# Patient Record
Sex: MALE | Race: BLACK OR AFRICAN AMERICAN | NOT HISPANIC OR LATINO | Employment: UNEMPLOYED | URBAN - NONMETROPOLITAN AREA
[De-identification: names, ages, dates, MRNs, and addresses within clinical notes are randomized per-mention and may not be internally consistent; named-entity substitution may affect disease eponyms.]

---

## 2024-11-04 ENCOUNTER — APPOINTMENT (OUTPATIENT)
Dept: RADIOLOGY | Facility: HOSPITAL | Age: 54
End: 2024-11-04

## 2024-11-04 ENCOUNTER — APPOINTMENT (OUTPATIENT)
Dept: CARDIOLOGY | Facility: HOSPITAL | Age: 54
End: 2024-11-04

## 2024-11-04 ENCOUNTER — APPOINTMENT (OUTPATIENT)
Dept: RADIOLOGY | Facility: HOSPITAL | Age: 54
End: 2024-11-04
Payer: COMMERCIAL

## 2024-11-04 ENCOUNTER — HOSPITAL ENCOUNTER (OUTPATIENT)
Facility: HOSPITAL | Age: 54
Setting detail: OBSERVATION
Discharge: HOME | End: 2024-11-05
Attending: EMERGENCY MEDICINE | Admitting: STUDENT IN AN ORGANIZED HEALTH CARE EDUCATION/TRAINING PROGRAM
Payer: COMMERCIAL

## 2024-11-04 DIAGNOSIS — R41.82 ALTERED MENTAL STATUS, UNSPECIFIED ALTERED MENTAL STATUS TYPE: ICD-10-CM

## 2024-11-04 DIAGNOSIS — E87.6 HYPOKALEMIA: ICD-10-CM

## 2024-11-04 DIAGNOSIS — I49.3 PVC'S (PREMATURE VENTRICULAR CONTRACTIONS): ICD-10-CM

## 2024-11-04 DIAGNOSIS — R65.20 SEVERE SEPSIS: Primary | ICD-10-CM
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
## 2024-11-04 DIAGNOSIS — E87.20 LACTIC ACIDOSIS: ICD-10-CM

## 2024-11-04 DIAGNOSIS — A41.9 SEVERE SEPSIS: Primary | ICD-10-CM

## 2024-11-04 DIAGNOSIS — T75.4XXA TASER INJURY, INITIAL ENCOUNTER: ICD-10-CM

## 2024-11-04 DIAGNOSIS — N40.0 ENLARGED PROSTATE: ICD-10-CM

## 2024-11-04 PROBLEM — G93.41 METABOLIC ENCEPHALOPATHY: Status: ACTIVE | Noted: 2024-11-04

## 2024-11-04 PROBLEM — N17.9 AKI (ACUTE KIDNEY INJURY) (CMS-HCC): Status: ACTIVE | Noted: 2024-11-04

## 2024-11-04 PROBLEM — F20.3 UNDIFFERENTIATED SCHIZOPHRENIA (MULTI): Status: ACTIVE | Noted: 2024-11-04

## 2024-11-04 LAB
ALBUMIN SERPL BCP-MCNC: 4.5 G/DL (ref 3.4–5)
ALP SERPL-CCNC: 76 U/L (ref 33–120)
ALT SERPL W P-5'-P-CCNC: 11 U/L (ref 10–52)
AMMONIA PLAS-SCNC: 31 UMOL/L (ref 16–53)
AMPHETAMINES UR QL SCN: NORMAL
ANION GAP BLDV CALCULATED.4IONS-SCNC: 14 MMOL/L (ref 10–25)
ANION GAP SERPL CALC-SCNC: 16 MMOL/L (ref 10–20)
APAP SERPL-MCNC: <10 UG/ML
APPEARANCE UR: ABNORMAL
AST SERPL W P-5'-P-CCNC: 14 U/L (ref 9–39)
BACTERIA #/AREA URNS AUTO: ABNORMAL /HPF
BARBITURATES UR QL SCN: NORMAL
BASE EXCESS BLDV CALC-SCNC: 1.7 MMOL/L (ref -2–3)
BASOPHILS # BLD AUTO: 0.04 X10*3/UL (ref 0–0.1)
BASOPHILS NFR BLD AUTO: 0.2 %
BENZODIAZ UR QL SCN: NORMAL
BILIRUB SERPL-MCNC: 1.4 MG/DL (ref 0–1.2)
BILIRUB UR STRIP.AUTO-MCNC: NEGATIVE MG/DL
BODY TEMPERATURE: ABNORMAL
BUN SERPL-MCNC: 12 MG/DL (ref 6–23)
BZE UR QL SCN: NORMAL
CA-I BLDV-SCNC: 1.23 MMOL/L (ref 1.1–1.33)
CALCIUM SERPL-MCNC: 9.8 MG/DL (ref 8.6–10.3)
CANNABINOIDS UR QL SCN: NORMAL
CARDIAC TROPONIN I PNL SERPL HS: 20 NG/L (ref 0–20)
CHLORIDE BLDV-SCNC: 101 MMOL/L (ref 98–107)
CHLORIDE SERPL-SCNC: 100 MMOL/L (ref 98–107)
CK SERPL-CCNC: 393 U/L (ref 0–325)
CO2 SERPL-SCNC: 25 MMOL/L (ref 21–32)
COLOR UR: YELLOW
CREAT SERPL-MCNC: 1.44 MG/DL (ref 0.5–1.3)
EGFRCR SERPLBLD CKD-EPI 2021: 58 ML/MIN/1.73M*2
EOSINOPHIL # BLD AUTO: 0.01 X10*3/UL (ref 0–0.7)
EOSINOPHIL NFR BLD AUTO: 0.1 %
ERYTHROCYTE [DISTWIDTH] IN BLOOD BY AUTOMATED COUNT: 13.3 % (ref 11.5–14.5)
ETHANOL SERPL-MCNC: <10 MG/DL
FENTANYL+NORFENTANYL UR QL SCN: NORMAL
FLUAV RNA RESP QL NAA+PROBE: NOT DETECTED
FLUBV RNA RESP QL NAA+PROBE: NOT DETECTED
GLUCOSE BLDV-MCNC: 115 MG/DL (ref 74–99)
GLUCOSE SERPL-MCNC: 111 MG/DL (ref 74–99)
GLUCOSE UR STRIP.AUTO-MCNC: NEGATIVE MG/DL
HCO3 BLDV-SCNC: 25.8 MMOL/L (ref 22–26)
HCT VFR BLD AUTO: 44.5 % (ref 41–52)
HCT VFR BLD EST: 49 % (ref 41–52)
HGB BLD-MCNC: 15.3 G/DL (ref 13.5–17.5)
HGB BLDV-MCNC: 16.2 G/DL (ref 13.5–17.5)
HOLD SPECIMEN: NORMAL
HYALINE CASTS #/AREA URNS AUTO: ABNORMAL /LPF
IMM GRANULOCYTES # BLD AUTO: 0.08 X10*3/UL (ref 0–0.7)
IMM GRANULOCYTES NFR BLD AUTO: 0.4 % (ref 0–0.9)
INHALED O2 CONCENTRATION: 21 %
KETONES UR STRIP.AUTO-MCNC: ABNORMAL MG/DL
LACTATE BLDV-SCNC: 6.6 MMOL/L (ref 0.4–2)
LACTATE SERPL-SCNC: 1.7 MMOL/L (ref 0.4–2)
LACTATE SERPL-SCNC: 5.3 MMOL/L (ref 0.4–2)
LEUKOCYTE ESTERASE UR QL STRIP.AUTO: NEGATIVE
LYMPHOCYTES # BLD AUTO: 1.27 X10*3/UL (ref 1.2–4.8)
LYMPHOCYTES NFR BLD AUTO: 6.7 %
MAGNESIUM SERPL-MCNC: 2.31 MG/DL (ref 1.6–2.4)
MCH RBC QN AUTO: 30.1 PG (ref 26–34)
MCHC RBC AUTO-ENTMCNC: 34.4 G/DL (ref 32–36)
MCV RBC AUTO: 88 FL (ref 80–100)
METHADONE UR QL SCN: NORMAL
MONOCYTES # BLD AUTO: 1.47 X10*3/UL (ref 0.1–1)
MONOCYTES NFR BLD AUTO: 7.8 %
MUCOUS THREADS #/AREA URNS AUTO: ABNORMAL /LPF
NEUTROPHILS # BLD AUTO: 16.01 X10*3/UL (ref 1.2–7.7)
NEUTROPHILS NFR BLD AUTO: 84.8 %
NITRITE UR QL STRIP.AUTO: NEGATIVE
NRBC BLD-RTO: 0 /100 WBCS (ref 0–0)
OPIATES UR QL SCN: NORMAL
OXYCODONE+OXYMORPHONE UR QL SCN: NORMAL
OXYHGB MFR BLDV: 68.6 % (ref 45–75)
PCO2 BLDV: 38 MM HG (ref 41–51)
PCP UR QL SCN: NORMAL
PH BLDV: 7.44 PH (ref 7.33–7.43)
PH UR STRIP.AUTO: 5 [PH]
PLATELET # BLD AUTO: 401 X10*3/UL (ref 150–450)
PO2 BLDV: 42 MM HG (ref 35–45)
POTASSIUM BLDV-SCNC: 3.4 MMOL/L (ref 3.5–5.3)
POTASSIUM SERPL-SCNC: 3.2 MMOL/L (ref 3.5–5.3)
PROT SERPL-MCNC: 7.6 G/DL (ref 6.4–8.2)
PROT UR STRIP.AUTO-MCNC: ABNORMAL MG/DL
RBC # BLD AUTO: 5.08 X10*6/UL (ref 4.5–5.9)
RBC # UR STRIP.AUTO: NEGATIVE /UL
RBC #/AREA URNS AUTO: ABNORMAL /HPF
SALICYLATES SERPL-MCNC: <3 MG/DL
SAO2 % BLDV: 70 % (ref 45–75)
SARS-COV-2 RNA RESP QL NAA+PROBE: NOT DETECTED
SODIUM BLDV-SCNC: 137 MMOL/L (ref 136–145)
SODIUM SERPL-SCNC: 138 MMOL/L (ref 136–145)
SP GR UR STRIP.AUTO: 1.05
UROBILINOGEN UR STRIP.AUTO-MCNC: <2 MG/DL
WBC # BLD AUTO: 18.9 X10*3/UL (ref 4.4–11.3)
WBC #/AREA URNS AUTO: ABNORMAL /HPF

## 2024-11-04 PROCEDURE — 87040 BLOOD CULTURE FOR BACTERIA: CPT | Mod: CONLAB | Performed by: EMERGENCY MEDICINE

## 2024-11-04 PROCEDURE — 70450 CT HEAD/BRAIN W/O DYE: CPT

## 2024-11-04 PROCEDURE — 96365 THER/PROPH/DIAG IV INF INIT: CPT | Mod: 59

## 2024-11-04 PROCEDURE — G0378 HOSPITAL OBSERVATION PER HR: HCPCS

## 2024-11-04 PROCEDURE — 87635 SARS-COV-2 COVID-19 AMP PRB: CPT | Performed by: EMERGENCY MEDICINE

## 2024-11-04 PROCEDURE — 84484 ASSAY OF TROPONIN QUANT: CPT | Performed by: EMERGENCY MEDICINE

## 2024-11-04 PROCEDURE — 2500000004 HC RX 250 GENERAL PHARMACY W/ HCPCS (ALT 636 FOR OP/ED): Performed by: EMERGENCY MEDICINE

## 2024-11-04 PROCEDURE — 2550000001 HC RX 255 CONTRASTS: Performed by: EMERGENCY MEDICINE

## 2024-11-04 PROCEDURE — 74177 CT ABD & PELVIS W/CONTRAST: CPT | Mod: FOREIGN READ | Performed by: RADIOLOGY

## 2024-11-04 PROCEDURE — 71045 X-RAY EXAM CHEST 1 VIEW: CPT

## 2024-11-04 PROCEDURE — 82140 ASSAY OF AMMONIA: CPT | Performed by: EMERGENCY MEDICINE

## 2024-11-04 PROCEDURE — 71260 CT THORAX DX C+: CPT

## 2024-11-04 PROCEDURE — 36415 COLL VENOUS BLD VENIPUNCTURE: CPT | Performed by: EMERGENCY MEDICINE

## 2024-11-04 PROCEDURE — 82550 ASSAY OF CK (CPK): CPT | Performed by: EMERGENCY MEDICINE

## 2024-11-04 PROCEDURE — 70450 CT HEAD/BRAIN W/O DYE: CPT | Performed by: RADIOLOGY

## 2024-11-04 PROCEDURE — 93005 ELECTROCARDIOGRAM TRACING: CPT

## 2024-11-04 PROCEDURE — 2500000002 HC RX 250 W HCPCS SELF ADMINISTERED DRUGS (ALT 637 FOR MEDICARE OP, ALT 636 FOR OP/ED): Performed by: NURSE PRACTITIONER

## 2024-11-04 PROCEDURE — 80307 DRUG TEST PRSMV CHEM ANLYZR: CPT | Performed by: EMERGENCY MEDICINE

## 2024-11-04 PROCEDURE — 96366 THER/PROPH/DIAG IV INF ADDON: CPT

## 2024-11-04 PROCEDURE — 71260 CT THORAX DX C+: CPT | Mod: FOREIGN READ | Performed by: RADIOLOGY

## 2024-11-04 PROCEDURE — 96367 TX/PROPH/DG ADDL SEQ IV INF: CPT

## 2024-11-04 PROCEDURE — 83735 ASSAY OF MAGNESIUM: CPT | Performed by: EMERGENCY MEDICINE

## 2024-11-04 PROCEDURE — 99291 CRITICAL CARE FIRST HOUR: CPT | Performed by: EMERGENCY MEDICINE

## 2024-11-04 PROCEDURE — 71045 X-RAY EXAM CHEST 1 VIEW: CPT | Mod: FOREIGN READ | Performed by: RADIOLOGY

## 2024-11-04 PROCEDURE — 85025 COMPLETE CBC W/AUTO DIFF WBC: CPT | Performed by: EMERGENCY MEDICINE

## 2024-11-04 PROCEDURE — 2500000005 HC RX 250 GENERAL PHARMACY W/O HCPCS: Performed by: NURSE PRACTITIONER

## 2024-11-04 PROCEDURE — 84132 ASSAY OF SERUM POTASSIUM: CPT | Mod: 59 | Performed by: EMERGENCY MEDICINE

## 2024-11-04 PROCEDURE — 80143 DRUG ASSAY ACETAMINOPHEN: CPT | Performed by: EMERGENCY MEDICINE

## 2024-11-04 PROCEDURE — 99222 1ST HOSP IP/OBS MODERATE 55: CPT | Performed by: NURSE PRACTITIONER

## 2024-11-04 PROCEDURE — 81001 URINALYSIS AUTO W/SCOPE: CPT | Performed by: EMERGENCY MEDICINE

## 2024-11-04 PROCEDURE — 84132 ASSAY OF SERUM POTASSIUM: CPT | Performed by: EMERGENCY MEDICINE

## 2024-11-04 PROCEDURE — 94760 N-INVAS EAR/PLS OXIMETRY 1: CPT

## 2024-11-04 PROCEDURE — 2500000001 HC RX 250 WO HCPCS SELF ADMINISTERED DRUGS (ALT 637 FOR MEDICARE OP): Performed by: NURSE PRACTITIONER

## 2024-11-04 PROCEDURE — 2500000002 HC RX 250 W HCPCS SELF ADMINISTERED DRUGS (ALT 637 FOR MEDICARE OP, ALT 636 FOR OP/ED): Performed by: EMERGENCY MEDICINE

## 2024-11-04 PROCEDURE — 83605 ASSAY OF LACTIC ACID: CPT | Mod: 91 | Performed by: EMERGENCY MEDICINE

## 2024-11-04 RX ORDER — QUETIAPINE FUMARATE 100 MG/1
100 TABLET, FILM COATED ORAL NIGHTLY
Status: DISCONTINUED | OUTPATIENT
Start: 2024-11-04 | End: 2024-11-05

## 2024-11-04 RX ORDER — PANTOPRAZOLE SODIUM 40 MG/1
40 TABLET, DELAYED RELEASE ORAL
Status: DISCONTINUED | OUTPATIENT
Start: 2024-11-05 | End: 2024-11-05 | Stop reason: HOSPADM

## 2024-11-04 RX ORDER — AMOXICILLIN AND CLAVULANATE POTASSIUM 875; 125 MG/1; MG/1
1 TABLET, FILM COATED ORAL EVERY 12 HOURS SCHEDULED
Status: DISCONTINUED | OUTPATIENT
Start: 2024-11-04 | End: 2024-11-05

## 2024-11-04 RX ORDER — ONDANSETRON 4 MG/1
4 TABLET, ORALLY DISINTEGRATING ORAL ONCE
Status: DISCONTINUED | OUTPATIENT
Start: 2024-11-04 | End: 2024-11-05

## 2024-11-04 RX ORDER — ENOXAPARIN SODIUM 100 MG/ML
40 INJECTION SUBCUTANEOUS EVERY 24 HOURS
Status: DISCONTINUED | OUTPATIENT
Start: 2024-11-04 | End: 2024-11-05 | Stop reason: HOSPADM

## 2024-11-04 RX ORDER — LORAZEPAM 0.5 MG/1
0.5 TABLET ORAL EVERY 8 HOURS PRN
Status: DISCONTINUED | OUTPATIENT
Start: 2024-11-04 | End: 2024-11-05 | Stop reason: HOSPADM

## 2024-11-04 RX ORDER — POLYETHYLENE GLYCOL 3350 17 G/17G
17 POWDER, FOR SOLUTION ORAL DAILY PRN
Status: DISCONTINUED | OUTPATIENT
Start: 2024-11-04 | End: 2024-11-05 | Stop reason: HOSPADM

## 2024-11-04 RX ORDER — WATER
500 LIQUID (ML) MISCELLANEOUS ONCE
Status: COMPLETED | OUTPATIENT
Start: 2024-11-04 | End: 2024-11-04

## 2024-11-04 RX ORDER — ONDANSETRON HYDROCHLORIDE 2 MG/ML
4 INJECTION, SOLUTION INTRAVENOUS EVERY 8 HOURS PRN
Status: DISCONTINUED | OUTPATIENT
Start: 2024-11-04 | End: 2024-11-05 | Stop reason: HOSPADM

## 2024-11-04 RX ORDER — POTASSIUM CHLORIDE 20 MEQ/1
40 TABLET, EXTENDED RELEASE ORAL ONCE
Status: COMPLETED | OUTPATIENT
Start: 2024-11-04 | End: 2024-11-04

## 2024-11-04 RX ORDER — TALC
6 POWDER (GRAM) TOPICAL NIGHTLY
Status: DISCONTINUED | OUTPATIENT
Start: 2024-11-04 | End: 2024-11-05 | Stop reason: HOSPADM

## 2024-11-04 RX ORDER — VANCOMYCIN 2 G/400ML
2 INJECTION, SOLUTION INTRAVENOUS ONCE
Status: COMPLETED | OUTPATIENT
Start: 2024-11-04 | End: 2024-11-04

## 2024-11-04 RX ORDER — ACETAMINOPHEN 325 MG/1
650 TABLET ORAL EVERY 4 HOURS PRN
Status: DISCONTINUED | OUTPATIENT
Start: 2024-11-04 | End: 2024-11-05 | Stop reason: HOSPADM

## 2024-11-04 SDOH — ECONOMIC STABILITY: HOUSING INSECURITY: AT ANY TIME IN THE PAST 12 MONTHS, WERE YOU HOMELESS OR LIVING IN A SHELTER (INCLUDING NOW)?: PATIENT DECLINED

## 2024-11-04 SDOH — SOCIAL STABILITY: SOCIAL INSECURITY
WITHIN THE LAST YEAR, HAVE YOU BEEN HUMILIATED OR EMOTIONALLY ABUSED IN OTHER WAYS BY YOUR PARTNER OR EX-PARTNER?: PATIENT DECLINED

## 2024-11-04 SDOH — SOCIAL STABILITY: SOCIAL INSECURITY: ARE YOU MARRIED, WIDOWED, DIVORCED, SEPARATED, NEVER MARRIED, OR LIVING WITH A PARTNER?: PATIENT DECLINED

## 2024-11-04 SDOH — SOCIAL STABILITY: SOCIAL INSECURITY
WITHIN THE LAST YEAR, HAVE YOU BEEN KICKED, HIT, SLAPPED, OR OTHERWISE PHYSICALLY HURT BY YOUR PARTNER OR EX-PARTNER?: PATIENT DECLINED

## 2024-11-04 SDOH — ECONOMIC STABILITY: FOOD INSECURITY: WITHIN THE PAST 12 MONTHS, THE FOOD YOU BOUGHT JUST DIDN'T LAST AND YOU DIDN'T HAVE MONEY TO GET MORE.: PATIENT DECLINED

## 2024-11-04 SDOH — SOCIAL STABILITY: SOCIAL NETWORK
DO YOU BELONG TO ANY CLUBS OR ORGANIZATIONS SUCH AS CHURCH GROUPS, UNIONS, FRATERNAL OR ATHLETIC GROUPS, OR SCHOOL GROUPS?: PATIENT DECLINED

## 2024-11-04 SDOH — SOCIAL STABILITY: SOCIAL NETWORK: HOW OFTEN DO YOU ATTEND MEETINGS OF THE CLUBS OR ORGANIZATIONS YOU BELONG TO?: PATIENT DECLINED

## 2024-11-04 SDOH — SOCIAL STABILITY: SOCIAL INSECURITY: HAS ANYONE EVER THREATENED TO HURT YOUR FAMILY OR YOUR PETS?: UNABLE TO ASSESS

## 2024-11-04 SDOH — SOCIAL STABILITY: SOCIAL NETWORK: IN A TYPICAL WEEK, HOW MANY TIMES DO YOU TALK ON THE PHONE WITH FAMILY, FRIENDS, OR NEIGHBORS?: PATIENT DECLINED

## 2024-11-04 SDOH — SOCIAL STABILITY: SOCIAL INSECURITY: ARE YOU OR HAVE YOU BEEN THREATENED OR ABUSED PHYSICALLY, EMOTIONALLY, OR SEXUALLY BY ANYONE?: UNABLE TO ASSESS

## 2024-11-04 SDOH — SOCIAL STABILITY: SOCIAL INSECURITY: DOES ANYONE TRY TO KEEP YOU FROM HAVING/CONTACTING OTHER FRIENDS OR DOING THINGS OUTSIDE YOUR HOME?: UNABLE TO ASSESS

## 2024-11-04 SDOH — SOCIAL STABILITY: SOCIAL INSECURITY: HAVE YOU HAD THOUGHTS OF HARMING ANYONE ELSE?: NO

## 2024-11-04 SDOH — SOCIAL STABILITY: SOCIAL INSECURITY
WITHIN THE LAST YEAR, HAVE YOU BEEN RAPED OR FORCED TO HAVE ANY KIND OF SEXUAL ACTIVITY BY YOUR PARTNER OR EX-PARTNER?: PATIENT DECLINED

## 2024-11-04 SDOH — SOCIAL STABILITY: SOCIAL INSECURITY: ABUSE: ADULT

## 2024-11-04 SDOH — SOCIAL STABILITY: SOCIAL INSECURITY: DO YOU FEEL ANYONE HAS EXPLOITED OR TAKEN ADVANTAGE OF YOU FINANCIALLY OR OF YOUR PERSONAL PROPERTY?: UNABLE TO ASSESS

## 2024-11-04 SDOH — SOCIAL STABILITY: SOCIAL NETWORK: HOW OFTEN DO YOU GET TOGETHER WITH FRIENDS OR RELATIVES?: PATIENT DECLINED

## 2024-11-04 SDOH — SOCIAL STABILITY: SOCIAL INSECURITY: WITHIN THE LAST YEAR, HAVE YOU BEEN AFRAID OF YOUR PARTNER OR EX-PARTNER?: PATIENT DECLINED

## 2024-11-04 SDOH — ECONOMIC STABILITY: TRANSPORTATION INSECURITY
IN THE PAST 12 MONTHS, HAS LACK OF TRANSPORTATION KEPT YOU FROM MEDICAL APPOINTMENTS OR FROM GETTING MEDICATIONS?: PATIENT DECLINED

## 2024-11-04 SDOH — HEALTH STABILITY: PHYSICAL HEALTH
ON AVERAGE, HOW MANY DAYS PER WEEK DO YOU ENGAGE IN MODERATE TO STRENUOUS EXERCISE (LIKE A BRISK WALK)?: PATIENT DECLINED

## 2024-11-04 SDOH — ECONOMIC STABILITY: FOOD INSECURITY: HOW HARD IS IT FOR YOU TO PAY FOR THE VERY BASICS LIKE FOOD, HOUSING, MEDICAL CARE, AND HEATING?: PATIENT DECLINED

## 2024-11-04 SDOH — ECONOMIC STABILITY: HOUSING INSECURITY: IN THE LAST 12 MONTHS, WAS THERE A TIME WHEN YOU WERE NOT ABLE TO PAY THE MORTGAGE OR RENT ON TIME?: PATIENT DECLINED

## 2024-11-04 SDOH — SOCIAL STABILITY: SOCIAL INSECURITY: ARE THERE ANY APPARENT SIGNS OF INJURIES/BEHAVIORS THAT COULD BE RELATED TO ABUSE/NEGLECT?: UNABLE TO ASSESS

## 2024-11-04 SDOH — ECONOMIC STABILITY: FOOD INSECURITY
WITHIN THE PAST 12 MONTHS, YOU WORRIED THAT YOUR FOOD WOULD RUN OUT BEFORE YOU GOT THE MONEY TO BUY MORE.: PATIENT DECLINED

## 2024-11-04 SDOH — SOCIAL STABILITY: SOCIAL INSECURITY: WERE YOU ABLE TO COMPLETE ALL THE BEHAVIORAL HEALTH SCREENINGS?: YES

## 2024-11-04 SDOH — HEALTH STABILITY: MENTAL HEALTH
DO YOU FEEL STRESS - TENSE, RESTLESS, NERVOUS, OR ANXIOUS, OR UNABLE TO SLEEP AT NIGHT BECAUSE YOUR MIND IS TROUBLED ALL THE TIME - THESE DAYS?: PATIENT DECLINED

## 2024-11-04 SDOH — HEALTH STABILITY: PHYSICAL HEALTH: ON AVERAGE, HOW MANY MINUTES DO YOU ENGAGE IN EXERCISE AT THIS LEVEL?: PATIENT DECLINED

## 2024-11-04 SDOH — SOCIAL STABILITY: SOCIAL INSECURITY: HAVE YOU HAD ANY THOUGHTS OF HARMING ANYONE ELSE?: UNABLE TO ASSESS

## 2024-11-04 SDOH — ECONOMIC STABILITY: INCOME INSECURITY
IN THE PAST 12 MONTHS HAS THE ELECTRIC, GAS, OIL, OR WATER COMPANY THREATENED TO SHUT OFF SERVICES IN YOUR HOME?: PATIENT DECLINED

## 2024-11-04 SDOH — SOCIAL STABILITY: SOCIAL INSECURITY: DO YOU FEEL UNSAFE GOING BACK TO THE PLACE WHERE YOU ARE LIVING?: UNABLE TO ASSESS

## 2024-11-04 SDOH — SOCIAL STABILITY: SOCIAL NETWORK: HOW OFTEN DO YOU ATTEND CHURCH OR RELIGIOUS SERVICES?: PATIENT DECLINED

## 2024-11-04 ASSESSMENT — COLUMBIA-SUICIDE SEVERITY RATING SCALE - C-SSRS
1. IN THE PAST MONTH, HAVE YOU WISHED YOU WERE DEAD OR WISHED YOU COULD GO TO SLEEP AND NOT WAKE UP?: NO
1. IN THE PAST MONTH, HAVE YOU WISHED YOU WERE DEAD OR WISHED YOU COULD GO TO SLEEP AND NOT WAKE UP?: NO
2. HAVE YOU ACTUALLY HAD ANY THOUGHTS OF KILLING YOURSELF?: NO
6. HAVE YOU EVER DONE ANYTHING, STARTED TO DO ANYTHING, OR PREPARED TO DO ANYTHING TO END YOUR LIFE?: NO
2. HAVE YOU ACTUALLY HAD ANY THOUGHTS OF KILLING YOURSELF?: NO
6. HAVE YOU EVER DONE ANYTHING, STARTED TO DO ANYTHING, OR PREPARED TO DO ANYTHING TO END YOUR LIFE?: NO

## 2024-11-04 ASSESSMENT — COGNITIVE AND FUNCTIONAL STATUS - GENERAL
PATIENT BASELINE BEDBOUND: NO
CLIMB 3 TO 5 STEPS WITH RAILING: A LITTLE
MOVING FROM LYING ON BACK TO SITTING ON SIDE OF FLAT BED WITH BEDRAILS: A LITTLE
HELP NEEDED FOR BATHING: A LITTLE
DRESSING REGULAR LOWER BODY CLOTHING: A LITTLE
MOBILITY SCORE: 18
EATING MEALS: A LITTLE
TOILETING: A LITTLE
MOVING TO AND FROM BED TO CHAIR: A LITTLE
PERSONAL GROOMING: A LITTLE
STANDING UP FROM CHAIR USING ARMS: A LITTLE
DAILY ACTIVITIY SCORE: 18
WALKING IN HOSPITAL ROOM: A LITTLE
TURNING FROM BACK TO SIDE WHILE IN FLAT BAD: A LITTLE
DRESSING REGULAR UPPER BODY CLOTHING: A LITTLE

## 2024-11-04 ASSESSMENT — PAIN - FUNCTIONAL ASSESSMENT
PAIN_FUNCTIONAL_ASSESSMENT: 0-10

## 2024-11-04 ASSESSMENT — ACTIVITIES OF DAILY LIVING (ADL)
WALKS IN HOME: NEEDS ASSISTANCE
PATIENT'S MEMORY ADEQUATE TO SAFELY COMPLETE DAILY ACTIVITIES?: NO
HEARING - LEFT EAR: FUNCTIONAL
DRESSING YOURSELF: NEEDS ASSISTANCE
BATHING: NEEDS ASSISTANCE
HEARING - RIGHT EAR: FUNCTIONAL
GROOMING: NEEDS ASSISTANCE
LACK_OF_TRANSPORTATION: PATIENT DECLINED
LACK_OF_TRANSPORTATION: PATIENT DECLINED
TOILETING: NEEDS ASSISTANCE
ADEQUATE_TO_COMPLETE_ADL: YES
JUDGMENT_ADEQUATE_SAFELY_COMPLETE_DAILY_ACTIVITIES: NO
FEEDING YOURSELF: NEEDS ASSISTANCE

## 2024-11-04 ASSESSMENT — PAIN SCALES - GENERAL
PAINLEVEL_OUTOF10: 0 - NO PAIN

## 2024-11-04 ASSESSMENT — LIFESTYLE VARIABLES
AUDIT-C TOTAL SCORE: -1
SKIP TO QUESTIONS 9-10: 0
AUDIT-C TOTAL SCORE: -1
HOW OFTEN DO YOU HAVE 6 OR MORE DRINKS ON ONE OCCASION: PATIENT DECLINED
HOW OFTEN DO YOU HAVE A DRINK CONTAINING ALCOHOL: PATIENT DECLINED
HOW MANY STANDARD DRINKS CONTAINING ALCOHOL DO YOU HAVE ON A TYPICAL DAY: PATIENT DECLINED

## 2024-11-04 ASSESSMENT — PATIENT HEALTH QUESTIONNAIRE - PHQ9
SUM OF ALL RESPONSES TO PHQ9 QUESTIONS 1 & 2: 0
1. LITTLE INTEREST OR PLEASURE IN DOING THINGS: NOT AT ALL
2. FEELING DOWN, DEPRESSED OR HOPELESS: NOT AT ALL

## 2024-11-04 ASSESSMENT — ENCOUNTER SYMPTOMS
EYES NEGATIVE: 1
MUSCULOSKELETAL NEGATIVE: 1
FATIGUE: 1
CHILLS: 1
DIFFICULTY URINATING: 1
DIARRHEA: 0
WEAKNESS: 1
NAUSEA: 1
FEVER: 1
ACTIVITY CHANGE: 1
RESPIRATORY NEGATIVE: 1
CARDIOVASCULAR NEGATIVE: 1
CONFUSION: 1

## 2024-11-04 NOTE — H&P
History Of Present Illness  Jey Morgan is a 54 y.o. male presenting with a complaint of altered mental status. He was brought to the ED by the [place] [employer]. For altered mental status. He was [place] and [place] [address] this morning. He was not cooperating with the police officers and became combative. He was confused and for his safety they took him into custody after tasing him in the back. He was at [facility] yesterday; he reports he went there because he felt crazy. He still had his arm band on. He has a hx of schizophrenia. He is a poor historian. He told the ED he is homeless; but has friends in [place] and [place], [place].    In the ED:  Labs: Glu 111; Na 138; K 3.2; BUN 12 Cr 1.44; ; Lactate 5.3; WBC 18.9; Hb 15.3; Hcrt 44.5;   Radiology: CT head no mass or hemorrhage. CT chest/Abd/pelvis Enlarged prostate. Otherwise unremarkable CT scan of the chest, abdomen and pelvis. No acute process.  Medications: Zosyn, vancomycin, zofran  Disposition: admitted to med/surg telemetry     Past Medical History  Past Medical History:   Diagnosis Date    Schizophrenia        Surgical History  Past Surgical History:   Procedure Laterality Date    JOINT REPLACEMENT      KNEE SURGERY Left         Social History  He reports that he has quit smoking. His smoking use included cigarettes. He does not have any smokeless tobacco history on file. He reports that he does not currently use alcohol. No history on file for drug use.    Family History  Family History   Problem Relation Name Age of Onset    Alcohol abuse Father          Allergies  Haldol [haloperidol]    Review of Systems   Constitutional:  Positive for activity change, chills, fatigue and fever.   HENT: Negative.     Eyes: Negative.    Respiratory: Negative.     Cardiovascular: Negative.    Gastrointestinal:  Positive for nausea. Negative for diarrhea.   Genitourinary:  Positive for difficulty urinating.   Musculoskeletal:  "Negative.    Skin: Negative.    Neurological:  Positive for weakness.   Psychiatric/Behavioral:  Positive for confusion.         Physical Exam  Vitals reviewed.   Constitutional:       Appearance: Normal appearance. He is normal weight.   HENT:      Head: Normocephalic and atraumatic.      Right Ear: External ear normal.      Left Ear: External ear normal.      Nose: Nose normal.      Mouth/Throat:      Mouth: Mucous membranes are moist.      Pharynx: Oropharynx is clear.   Eyes:      Conjunctiva/sclera: Conjunctivae normal.      Pupils: Pupils are equal, round, and reactive to light.   Cardiovascular:      Rate and Rhythm: Normal rate and regular rhythm.      Pulses: Normal pulses.      Heart sounds: Normal heart sounds.   Pulmonary:      Effort: Pulmonary effort is normal.      Breath sounds: Normal breath sounds.   Abdominal:      General: Bowel sounds are normal.      Palpations: Abdomen is soft.   Musculoskeletal:         General: Normal range of motion.      Cervical back: Normal range of motion and neck supple.   Skin:     General: Skin is warm and dry.   Neurological:      General: No focal deficit present.      Mental Status: He is alert. He is disoriented.      Motor: Weakness present.   Psychiatric:         Mood and Affect: Mood normal.         Behavior: Behavior normal.          Last Recorded Vitals  Blood pressure 153/88, pulse 93, temperature 37 °C (98.6 °F), temperature source Temporal, resp. rate 18, height 1.778 m (5' 10\"), weight 80.7 kg (177 lb 14.6 oz), SpO2 98%.    Relevant Results    Scheduled medications  enoxaparin, 40 mg, subcutaneous, q24h  melatonin, 6 mg, oral, Nightly  ondansetron ODT, 4 mg, oral, Once  [START ON 11/5/2024] pantoprazole, 40 mg, oral, Daily before breakfast  QUEtiapine, 100 mg, oral, Nightly      Continuous medications     PRN medications  PRN medications: acetaminophen, ondansetron, polyethylene glycol    Results for orders placed or performed during the hospital " encounter of 11/04/24 (from the past 24 hours)   Sars-CoV-2 PCR   Result Value Ref Range    Coronavirus 2019, PCR Not Detected Not Detected   Influenza A, and B PCR   Result Value Ref Range    Flu A Result Not Detected Not Detected    Flu B Result Not Detected Not Detected   CBC and Auto Differential   Result Value Ref Range    WBC 18.9 (H) 4.4 - 11.3 x10*3/uL    nRBC 0.0 0.0 - 0.0 /100 WBCs    RBC 5.08 4.50 - 5.90 x10*6/uL    Hemoglobin 15.3 13.5 - 17.5 g/dL    Hematocrit 44.5 41.0 - 52.0 %    MCV 88 80 - 100 fL    MCH 30.1 26.0 - 34.0 pg    MCHC 34.4 32.0 - 36.0 g/dL    RDW 13.3 11.5 - 14.5 %    Platelets 401 150 - 450 x10*3/uL    Neutrophils % 84.8 40.0 - 80.0 %    Immature Granulocytes %, Automated 0.4 0.0 - 0.9 %    Lymphocytes % 6.7 13.0 - 44.0 %    Monocytes % 7.8 2.0 - 10.0 %    Eosinophils % 0.1 0.0 - 6.0 %    Basophils % 0.2 0.0 - 2.0 %    Neutrophils Absolute 16.01 (H) 1.20 - 7.70 x10*3/uL    Immature Granulocytes Absolute, Automated 0.08 0.00 - 0.70 x10*3/uL    Lymphocytes Absolute 1.27 1.20 - 4.80 x10*3/uL    Monocytes Absolute 1.47 (H) 0.10 - 1.00 x10*3/uL    Eosinophils Absolute 0.01 0.00 - 0.70 x10*3/uL    Basophils Absolute 0.04 0.00 - 0.10 x10*3/uL   Magnesium   Result Value Ref Range    Magnesium 2.31 1.60 - 2.40 mg/dL   Comprehensive metabolic panel   Result Value Ref Range    Glucose 111 (H) 74 - 99 mg/dL    Sodium 138 136 - 145 mmol/L    Potassium 3.2 (L) 3.5 - 5.3 mmol/L    Chloride 100 98 - 107 mmol/L    Bicarbonate 25 21 - 32 mmol/L    Anion Gap 16 10 - 20 mmol/L    Urea Nitrogen 12 6 - 23 mg/dL    Creatinine 1.44 (H) 0.50 - 1.30 mg/dL    eGFR 58 (L) >60 mL/min/1.73m*2    Calcium 9.8 8.6 - 10.3 mg/dL    Albumin 4.5 3.4 - 5.0 g/dL    Alkaline Phosphatase 76 33 - 120 U/L    Total Protein 7.6 6.4 - 8.2 g/dL    AST 14 9 - 39 U/L    Bilirubin, Total 1.4 (H) 0.0 - 1.2 mg/dL    ALT 11 10 - 52 U/L   Ammonia   Result Value Ref Range    Ammonia 31 16 - 53 umol/L   Troponin I, High Sensitivity   Result  Value Ref Range    Troponin I, High Sensitivity 20 0 - 20 ng/L   Blood Gas Venous Full Panel   Result Value Ref Range    POCT pH, Venous 7.44 (H) 7.33 - 7.43 pH    POCT pCO2, Venous 38 (L) 41 - 51 mm Hg    POCT pO2, Venous 42 35 - 45 mm Hg    POCT SO2, Venous 70 45 - 75 %    POCT Oxy Hemoglobin, Venous 68.6 45.0 - 75.0 %    POCT Hematocrit Calculated, Venous 49.0 41.0 - 52.0 %    POCT Sodium, Venous 137 136 - 145 mmol/L    POCT Potassium, Venous 3.4 (L) 3.5 - 5.3 mmol/L    POCT Chloride, Venous 101 98 - 107 mmol/L    POCT Ionized Calicum, Venous 1.23 1.10 - 1.33 mmol/L    POCT Glucose, Venous 115 (H) 74 - 99 mg/dL    POCT Lactate, Venous 6.6 (HH) 0.4 - 2.0 mmol/L    POCT Base Excess, Venous 1.7 -2.0 - 3.0 mmol/L    POCT HCO3 Calculated, Venous 25.8 22.0 - 26.0 mmol/L    POCT Hemoglobin, Venous 16.2 13.5 - 17.5 g/dL    POCT Anion Gap, Venous 14.0 10.0 - 25.0 mmol/L    Patient Temperature      FiO2 21 %   Acute Toxicology Panel, Blood   Result Value Ref Range    Acetaminophen <10.0 10.0 - 30.0 ug/mL    Salicylate  <3 4 - 20 mg/dL    Alcohol <10 <=10 mg/dL   Light Blue Top   Result Value Ref Range    Extra Tube Hold for add-ons.    SST TOP   Result Value Ref Range    Extra Tube Hold for add-ons.    Gray Top   Result Value Ref Range    Extra Tube Hold for add-ons.    Creatine Kinase   Result Value Ref Range    Creatine Kinase 393 (H) 0 - 325 U/L   Lactate   Result Value Ref Range    Lactate 5.3 (HH) 0.4 - 2.0 mmol/L   Drug Screen, Urine   Result Value Ref Range    Amphetamine Screen, Urine Presumptive Negative Presumptive Negative    Barbiturate Screen, Urine Presumptive Negative Presumptive Negative    Benzodiazepines Screen, Urine Presumptive Negative Presumptive Negative    Cannabinoid Screen, Urine Presumptive Negative Presumptive Negative    Cocaine Metabolite Screen, Urine Presumptive Negative Presumptive Negative    Fentanyl Screen, Urine Presumptive Negative Presumptive Negative    Opiate Screen, Urine  Presumptive Negative Presumptive Negative    Oxycodone Screen, Urine Presumptive Negative Presumptive Negative    PCP Screen, Urine Presumptive Negative Presumptive Negative    Methadone Screen, Urine Presumptive Negative Presumptive Negative   Urinalysis with Reflex Culture and Microscopic   Result Value Ref Range    Color, Urine Yellow Straw, Yellow    Appearance, Urine Hazy (N) Clear    Specific Gravity, Urine 1.053 (N) 1.005 - 1.035    pH, Urine 5.0 5.0, 5.5, 6.0, 6.5, 7.0, 7.5, 8.0    Protein, Urine 30 (1+) (N) NEGATIVE mg/dL    Glucose, Urine NEGATIVE NEGATIVE mg/dL    Blood, Urine NEGATIVE NEGATIVE    Ketones, Urine 20 (1+) (A) NEGATIVE mg/dL    Bilirubin, Urine NEGATIVE NEGATIVE    Urobilinogen, Urine <2.0 <2.0 mg/dL    Nitrite, Urine NEGATIVE NEGATIVE    Leukocyte Esterase, Urine NEGATIVE NEGATIVE   Urinalysis Microscopic   Result Value Ref Range    WBC, Urine 1-5 1-5, NONE /HPF    RBC, Urine 1-2 NONE, 1-2, 3-5 /HPF    Bacteria, Urine 1+ (A) NONE SEEN /HPF    Mucus, Urine 4+ Reference range not established. /LPF    Hyaline Casts, Urine 3+ (A) NONE /LPF   Lactate   Result Value Ref Range    Lactate 1.7 0.4 - 2.0 mmol/L             Assessment/Plan   Assessment & Plan  Sepsis (Multi)    Hypokalemia    LIZANDRO (acute kidney injury) (CMS-[facility])    Metabolic encephalopathy    Undifferentiated schizophrenia (Multi)      Sepsis of unknown origin  Metabolic Encephalopathy d/t infection vs psych  Hx Schizophrenia  - WBC 18.9  - COVID negative/Flu negative  - Lactate 5.3 on admission; now 1.7  - given  2 liters of 0.9% NS in the ED  - given zosyn 4.5 g and Vancomycin 2g in the ED  - started Augmentin 875 mg BID  - continue quetiapine 100 mg hs  - started lorazepam 0.5 mg q8h prn  - Psych consult  - CT chest/Abd/pelvis: Enlarged prostate. Otherwise unremarkable CT scan of the chest, abdomen and pelvis. No acute process.  - CT head: No evidence of acute cortical infarct or intracranial hemorrhage.      Hypokalemia  - K 3.2  -  given 40 mEq of KCL in the ED  - monitor BMP    Acute Kidney injury  - creatine 1.44 on admission  - baseline creatine unknown  -   - given 2 liters of IVF in the ED  - monitor renal function    PUD ppx  - started pantoprazole 40 mg daily    DVT ppx  - started enoxaparin 40 mg daily    Code status: Full    Disposition: patient requires less than 2 inpatient days.           Tiffanie Han, APRN-CNP

## 2024-11-04 NOTE — ED PROVIDER NOTES
HPI   Chief Complaint   Patient presents with    Altered Mental Status       54-year-old male presents via [place] fire department and police from residential for altered mental status.  Per police and fire, patient was [place] [address] [place].  He was not cooperating with police officers and became combative.  Out of concern for his safety given the fact that he was walking along interstate and he was cooperative, he was tased in the back by police.  He was then placed into custody and taken to residential for processing.  He has been noted to be confused.  He is awake.  Clear speech.  Denies substance use.  Denies homicidal or suicidal ideation.  Denies hallucinations.  States that he is homeless.  He had an armband from [facility] in [place].  He was discharged from that facility about 2 in the morning.  Unclear how he made his way to [facility] [place].  States that he walked.  States that he has family and friends in [place] in [place].      History provided by:  Patient, EMS personnel and police          Patient History   History reviewed. No pertinent past medical history.  History reviewed. No pertinent surgical history.  No family history on file.  Social History     Tobacco Use    Smoking status: Unknown    Smokeless tobacco: Not on file   Substance Use Topics    Alcohol use: Not on file    Drug use: Not on file       Physical Exam   ED Triage Vitals [11/04/24 1126]   Temperature Heart Rate Respirations BP   36.7 °C (98.1 °F) 89 17 (!) 144/95      SpO2 Temp Source Heart Rate Source Patient Position   96 % Temporal Monitor Lying      BP Location FiO2 (%)     Right arm --       Physical Exam  Vitals and nursing note reviewed.   Constitutional:       General: He is not in acute distress.     Appearance: He is well-developed.      Comments: Alert gentleman resting comfortably in the cot.  Appears disheveled.   HENT:      Head: Normocephalic. Abrasion present. No raccoon eyes or  Alexander's sign.      Comments: Left forehead abrasion.  Midface stable.  No malocclusion.  Eyes:      Conjunctiva/sclera: Conjunctivae normal.   Cardiovascular:      Rate and Rhythm: Regular rhythm. Tachycardia present.      Pulses: Normal pulses.   Pulmonary:      Effort: Pulmonary effort is normal. No respiratory distress.      Breath sounds: Normal breath sounds.   Abdominal:      Palpations: Abdomen is soft.      Tenderness: There is no abdominal tenderness.   Musculoskeletal:         General: No swelling, tenderness or deformity. Normal range of motion.      Cervical back: Neck supple.      Comments: No tenderness along the entire midline spine.  Chest wall stable, secure.  No bony tenderness to bilateral upper or lower extremities.  Pelvis stable, secure.   Skin:     General: Skin is warm and dry.      Capillary Refill: Capillary refill takes less than 2 seconds.   Neurological:      Mental Status: He is alert. He is disoriented.      Comments: Awake.  Alert.  Clear speech.  Oriented to name and location.  Unable to explain recent events.  States that he walked here from [place].   Psychiatric:         Mood and Affect: Mood normal.           ED Course & MDM   ED Course as of 11/04/24 1424   Mon Nov 04, 2024   1137 54-year-old male presents via [place] [facility] and fire after he was taken into custody.  He is here for evaluation of altered mental status.  He is awake.  He is protecting his airway.  He is in no distress.  He is disoriented.  Oriented to person and location.  States that he walked here from area.  He has a forehead abrasion.  Telemetry monitoring.  EKG to evaluate for cardiac dysrhythmias in setting of being tased.  Labs.  Urinalysis.  CT head and chest x-ray. [BT]   1139 ECG 12 lead  EKG interpreted by me shows sinus tachycardia with PVCs.  Rate = 117.  Left axis deviation.  No acute injury pattern. [BT]   1207 POCT Lactate, Venous(!!): 6.6  Venous gas with lactate of 6.6.  Source unclear.  Acidosis  noted with WBC 18.9.  Placed in sepsis care path.  Will give fluid bolus check blood and urine cultures and evaluate for source of infection. [BT]   1207 Venous gas with lactate of 6.6.  Source unclear.  Acidosis noted with WBC 18.9.  Placed in sepsis care path.  Will give fluid bolus check blood and urine cultures and evaluate for source of infection in addition to current workup, will check CT chest abdomen and pelvis to evaluate for source. [BT]   1229 POTASSIUM(!): 3.2  Replaced by mouth [BT]   1232 Ammonia: 31  Ammonia 31.  No evidence of hepatic encephalopathy [BT]   1252 Creatine Kinase(!): 393  Borderline elevated CK at 393 [BT]   1253 XR chest 1 view  IMPRESSION:  No regions of airspace consolidation.    Noted [BT]   1308 IMPRESSION:  No evidence of acute cortical infarct or intracranial hemorrhage.    Noted. [BT]   1315 Lactate(!!): 5.3  Lactate 5.3.  Will recheck after hydration. [BT]   1410 CT chest abdomen pelvis w IV contrast  Enlarged prostate.  Otherwise unremarkable CT scan of the chest, abdomen and pelvis.  No acute process.    Noted [BT]   1423 Source of infection unclear.  Await urinalysis.  Advised nurse to straight cath the patient.  Case discussed with medicine hospitalist team Xochilt Han NP who accepted patient for admission on behalf of Dr. Hernández for further evaluation management of severe sepsis, altered mental status. [BT]      ED Course User Index  [BT] Krystian Osborne DO         Diagnoses as of 11/04/24 1424   Altered mental status, unspecified altered mental status type   Taser injury, initial encounter   PVC's (premature ventricular contractions)   Lactic acidosis   Hypokalemia   Severe sepsis   Enlarged prostate                 No data recorded     Philippe Coma Scale Score: 14 (11/04/24 1203 : JARON Vanessa, MARGARITA)                           Medical Decision Making      Procedure  Critical Care    Performed by: Krystian Osborne DO  Authorized by: Krystian Osborne DO    Critical  care provider statement:     Critical care time (minutes):  35    Critical care start time:  11/4/2024 1:00 PM    Critical care end time:  11/4/2024 1:35 PM    Critical care was necessary to treat or prevent imminent or life-threatening deterioration of the following conditions:  Sepsis    Critical care was time spent personally by me on the following activities:  Ordering and review of radiographic studies, ordering and review of laboratory studies, pulse oximetry, re-evaluation of patient's condition, examination of patient and evaluation of patient's response to treatment    Care discussed with: admitting provider         Krystian Osborne DO  11/04/24 1422

## 2024-11-05 VITALS
DIASTOLIC BLOOD PRESSURE: 86 MMHG | BODY MASS INDEX: 25.47 KG/M2 | HEIGHT: 70 IN | TEMPERATURE: 98.8 F | OXYGEN SATURATION: 94 % | HEART RATE: 109 BPM | SYSTOLIC BLOOD PRESSURE: 136 MMHG | RESPIRATION RATE: 20 BRPM | WEIGHT: 177.91 LBS

## 2024-11-05 LAB
ANION GAP SERPL CALC-SCNC: 9 MMOL/L (ref 10–20)
ATRIAL RATE: 117 BPM
BUN SERPL-MCNC: 10 MG/DL (ref 6–23)
CALCIUM SERPL-MCNC: 8.7 MG/DL (ref 8.6–10.3)
CHLORIDE SERPL-SCNC: 103 MMOL/L (ref 98–107)
CK SERPL-CCNC: 804 U/L (ref 0–325)
CO2 SERPL-SCNC: 25 MMOL/L (ref 21–32)
CREAT SERPL-MCNC: 1.02 MG/DL (ref 0.5–1.3)
EGFRCR SERPLBLD CKD-EPI 2021: 87 ML/MIN/1.73M*2
ERYTHROCYTE [DISTWIDTH] IN BLOOD BY AUTOMATED COUNT: 13.6 % (ref 11.5–14.5)
GLUCOSE SERPL-MCNC: 120 MG/DL (ref 74–99)
HCT VFR BLD AUTO: 40.6 % (ref 41–52)
HGB BLD-MCNC: 13.9 G/DL (ref 13.5–17.5)
HOLD SPECIMEN: NORMAL
MCH RBC QN AUTO: 29.9 PG (ref 26–34)
MCHC RBC AUTO-ENTMCNC: 34.2 G/DL (ref 32–36)
MCV RBC AUTO: 87 FL (ref 80–100)
NRBC BLD-RTO: 0 /100 WBCS (ref 0–0)
P AXIS: 69 DEGREES
P OFFSET: 199 MS
P ONSET: 150 MS
PLATELET # BLD AUTO: 359 X10*3/UL (ref 150–450)
POTASSIUM SERPL-SCNC: 3.2 MMOL/L (ref 3.5–5.3)
PR INTERVAL: 122 MS
Q ONSET: 211 MS
QRS COUNT: 20 BEATS
QRS DURATION: 94 MS
QT INTERVAL: 350 MS
QTC CALCULATION(BAZETT): 488 MS
QTC FREDERICIA: 437 MS
R AXIS: -35 DEGREES
RBC # BLD AUTO: 4.65 X10*6/UL (ref 4.5–5.9)
SODIUM SERPL-SCNC: 134 MMOL/L (ref 136–145)
T AXIS: 51 DEGREES
T OFFSET: 386 MS
VENTRICULAR RATE: 117 BPM
WBC # BLD AUTO: 12 X10*3/UL (ref 4.4–11.3)

## 2024-11-05 PROCEDURE — 99239 HOSP IP/OBS DSCHRG MGMT >30: CPT | Performed by: STUDENT IN AN ORGANIZED HEALTH CARE EDUCATION/TRAINING PROGRAM

## 2024-11-05 PROCEDURE — 85027 COMPLETE CBC AUTOMATED: CPT | Performed by: NURSE PRACTITIONER

## 2024-11-05 PROCEDURE — 2500000001 HC RX 250 WO HCPCS SELF ADMINISTERED DRUGS (ALT 637 FOR MEDICARE OP): Performed by: NURSE PRACTITIONER

## 2024-11-05 PROCEDURE — 36415 COLL VENOUS BLD VENIPUNCTURE: CPT | Performed by: NURSE PRACTITIONER

## 2024-11-05 PROCEDURE — 2500000002 HC RX 250 W HCPCS SELF ADMINISTERED DRUGS (ALT 637 FOR MEDICARE OP, ALT 636 FOR OP/ED): Performed by: STUDENT IN AN ORGANIZED HEALTH CARE EDUCATION/TRAINING PROGRAM

## 2024-11-05 PROCEDURE — G0427 INPT/ED TELECONSULT70: HCPCS

## 2024-11-05 PROCEDURE — 80048 BASIC METABOLIC PNL TOTAL CA: CPT | Performed by: NURSE PRACTITIONER

## 2024-11-05 PROCEDURE — 94760 N-INVAS EAR/PLS OXIMETRY 1: CPT | Mod: CCI

## 2024-11-05 PROCEDURE — G0378 HOSPITAL OBSERVATION PER HR: HCPCS

## 2024-11-05 PROCEDURE — 82550 ASSAY OF CK (CPK): CPT | Performed by: NURSE PRACTITIONER

## 2024-11-05 RX ORDER — DIPHENHYDRAMINE HYDROCHLORIDE 50 MG/ML
25 INJECTION INTRAMUSCULAR; INTRAVENOUS ONCE
Status: DISCONTINUED | OUTPATIENT
Start: 2024-11-05 | End: 2024-11-05 | Stop reason: HOSPADM

## 2024-11-05 RX ORDER — LORAZEPAM 2 MG/ML
INJECTION INTRAMUSCULAR
Status: DISCONTINUED
Start: 2024-11-05 | End: 2024-11-05 | Stop reason: HOSPADM

## 2024-11-05 RX ORDER — DIPHENHYDRAMINE HYDROCHLORIDE 50 MG/ML
INJECTION INTRAMUSCULAR; INTRAVENOUS
Status: DISCONTINUED
Start: 2024-11-05 | End: 2024-11-05 | Stop reason: HOSPADM

## 2024-11-05 RX ORDER — POTASSIUM CHLORIDE 20 MEQ/1
40 TABLET, EXTENDED RELEASE ORAL ONCE
Status: COMPLETED | OUTPATIENT
Start: 2024-11-05 | End: 2024-11-05

## 2024-11-05 RX ORDER — LORAZEPAM 2 MG/ML
1 INJECTION INTRAMUSCULAR ONCE
Status: DISCONTINUED | OUTPATIENT
Start: 2024-11-05 | End: 2024-11-05 | Stop reason: HOSPADM

## 2024-11-05 ASSESSMENT — PAIN SCALES - GENERAL
PAINLEVEL_OUTOF10: 0 - NO PAIN

## 2024-11-05 ASSESSMENT — COGNITIVE AND FUNCTIONAL STATUS - GENERAL
TURNING FROM BACK TO SIDE WHILE IN FLAT BAD: A LITTLE
WALKING IN HOSPITAL ROOM: A LITTLE
DAILY ACTIVITIY SCORE: 21
MOVING TO AND FROM BED TO CHAIR: A LITTLE
MOBILITY SCORE: 18
MOVING FROM LYING ON BACK TO SITTING ON SIDE OF FLAT BED WITH BEDRAILS: A LITTLE
DRESSING REGULAR UPPER BODY CLOTHING: A LITTLE
HELP NEEDED FOR BATHING: A LITTLE
DRESSING REGULAR LOWER BODY CLOTHING: A LITTLE
CLIMB 3 TO 5 STEPS WITH RAILING: A LITTLE
STANDING UP FROM CHAIR USING ARMS: A LITTLE

## 2024-11-05 ASSESSMENT — PAIN - FUNCTIONAL ASSESSMENT
PAIN_FUNCTIONAL_ASSESSMENT: 0-10

## 2024-11-05 NOTE — NURSING NOTE
"Pt is alert and cooperative. Pt was asked for some personal information to add to his chart but pt states he is homeless, doesn't have a cell phone and does not have a person to add as a contact. Pt has been asking where his step daughter is. Pt was told that no one has been here all night. He continues to come out of his room and states \"I know I don't see no ghost\". Pt has been reassured that step daughter has not been in the building all night. Pt states he is from [place] but has family in [place] and that's why he was out there.   "

## 2024-11-05 NOTE — NURSING NOTE
This RN assessed pt @ 1945. Pt laying in bed with eyes closed. Name called and pt would not open eyes. After 4 times saying pt's name he did open eyes. This RN asked if he heard his name being called and he said yes, he said he was tired. Pt refused Lovenox shot after giving educational print out and explaining the risks and benefits. Call light within reach. Will continue to monitor.

## 2024-11-05 NOTE — CONSULTS
"Referring Provider: RO Burton  Date: 11/5/24    Reason For Consult: altered mental status with history of schizophrenia      Chief Complaint: \"I was walking, I went crazy\"    History Of Present Illness  Jey Morgan is a 54 y.o. year old male patient that presented to [facility] ED on 11/4/24 for altered mental status.  [place] [address], not cooperating with police and became combative.  Patient was then tased in the back and taken into police custody.  Per chart patient was at [facility] the day prior.  Patient admitted to medical floor for sepsis, unknown source.  WBC on admission 18.9, today 12.  CK trending up from 393 to 804.  UDS is negative.  Per chart review patient was confused and thinking his step daughter was in the hospital last night, stating \"I know I don't see no ghost\".  Psychiatry consulted for altered mental status with history of schizophrenia.               Patient interviewed via IPAD and agreeable to telepsych consult.  During interview patient alert and oriented x 4.  Patient is a poor historian.  This writer asked patient to recall what he remembers yesterday stating he remembers walking down the highway.  Reports before that was blur, does not remember waking up yesterday.  Patient reports he follows with [facility] in [place], [place] for his schizophrenia.  This writer called facility to obtain medication list, patient only takes quetiapine 100 mg PO at bedtime.  He sees Abelardo Obrien CNP there, facility reported he is an established patient.  Patient reports schizophrenia has been under control however the past couple of nights he hasn't been able to sleep.  Patient does report AH, stating they say \"here and there, here and there\", denies command hallucinations.  Reports [facility] of seeing ghosts.  Reports sometimes he does get paranoid, however is not currently paranoid.  Reports he has never been confused like this before in " "the past.              Denies any feelings of anxiety.  Does report feeling depressed today.  Denies SI/HI.        Per nurse patient is more oriented today.  Cooperative, reports can get agitated at times.  Does not appear to be internally stimulated.  No hallucinations reported, no delusions.        PSYCHIATRIC REVIEW OF SYMPTOMS  Depressive Symptoms: depressed or irritable mood and insomnia or hypersomnia  Manic Symptoms: negative  Anxiety Symptoms: negative  Psychotic Symptoms:  negative  Delirium/Altered Mental Status Symptoms: change in cognition, perceptual disturbance, general medical condition present, and symptoms fluctuate during course of day  Other Symptoms/Concerns:  negative    Developmental Concerns:  negative  Disordered Eating Symptoms:  negative  Inattentive Symptoms:  negative  Hyperactive/Impulsive Symptoms:  negative  Conduct Issues:  negative      Past Medical History  Past Medical History:   Diagnosis Date    Schizophrenia         Past Psychiatric History: 1) Past Dx:  schizophrenia                                             2) Prior psychiatric hospitalizations- about a year ago for schizophrenia. \"Been hospitalized plenty of times\"                                              3) Prior suicide attempts- \"Plenty of times\"                                             4) No prior SIB                                            5) No prior rehab treatment programs                                            6) Current psych meds: quetiapine 100 mg PO at bedtime, melatonin 6 mg PO at bedtime   Risperidone was good.      Past Psychiatric Meds: 1) haldol- itching                                          2) \"reports all different medications\".                                                Family History: 1) Nephew- schizophrenia                             2) No known suicides in the family.    Social History  Social History     Socioeconomic History    Marital status: Unknown     Spouse name: Not on " file    Number of children: Not on file    Years of education: Not on file    Highest education level: Not on file   Occupational History    Not on file   Tobacco Use    Smoking status: Former     Types: Cigarettes    Smokeless tobacco: Not on file   Substance and Sexual Activity    Alcohol use: Not Currently    Drug use: Not on file    Sexual activity: Not on file   Other Topics Concern    Not on file   Social History Narrative    Not on file     Social Drivers of Health     Financial Resource Strain: Patient Declined (11/4/2024)    Overall Financial Resource Strain (CARDIA)     Difficulty of Paying Living Expenses: Patient declined   Food Insecurity: Patient Declined (11/4/2024)    Hunger Vital Sign     Worried About Running Out of Food in the Last Year: Patient declined     Ran Out of Food in the Last Year: Patient declined   Transportation Needs: Patient Declined (11/4/2024)    PRAPARE - Transportation     Lack of Transportation (Medical): Patient declined     Lack of Transportation (Non-Medical): Patient declined   Physical Activity: Patient Declined (11/4/2024)    Exercise Vital Sign     Days of Exercise per Week: Patient declined     Minutes of Exercise per Session: Patient declined   Stress: Patient Declined (11/4/2024)    [employer] - Occupational Stress Questionnaire     Feeling of Stress : Patient declined   Social Connections: Patient Declined (11/4/2024)    Social Connection and Isolation Panel [NHANES]     Frequency of Communication with Friends and Family: Patient declined     Frequency of Social Gatherings with Friends and Family: Patient declined     Attends Scientologist Services: Patient declined     Active Member of Clubs or Organizations: Patient declined     Attends Club or Organization Meetings: Patient declined     Marital Status: Patient declined   Intimate Partner Violence: Patient Declined (11/4/2024)    Humiliation, Afraid, Rape, and Kick questionnaire     Fear  "of Current or Ex-Partner: Patient declined     Emotionally Abused: Patient declined     Physically Abused: Patient declined     Sexually Abused: Patient declined   Housing Stability: Patient Declined (11/4/2024)    Housing Stability Vital Sign     Unable to Pay for Housing in the Last Year: Patient declined     Number of Times Moved in the Last Year: 0     Homeless in the Last Year: Patient declined        Substance Abuse History:  1) Tobacco - Denies  2) ETOH - Denies  3) Cannabis - Denies  4) Denies any illicit drug use.        The patient did not graduate high school.  Reports currently unemployed.  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  Never . 1 children. No significant legal history. The patient reports he is currently homeless, unable to tell me how long he has been homeless, reports he was staying with friends prior to being homeless.           Allergies  Allergies   Allergen Reactions    Haldol [Haloperidol] Itching        Scheduled medications  amoxicillin-pot clavulanate, 1 tablet, oral, q12h HEMALATHA  enoxaparin, 40 mg, subcutaneous, q24h  melatonin, 6 mg, oral, Nightly  ondansetron ODT, 4 mg, oral, Once  pantoprazole, 40 mg, oral, Daily before breakfast  QUEtiapine, 100 mg, oral, Nightly      Continuous medications     PRN medications  PRN medications: acetaminophen, LORazepam, ondansetron, polyethylene glycol         Review of Systems   Review of Systems   Unable to perform ROS: Mental status change        Physical Exam  Mental Status Exam:   General: Appropriately groomed and dressed in hospital attire.   Appearance: Appears stated age.   Attitude: Calm, cooperative.   Behavior: Appropriate eye contact.   Motor Activity: No agitation or retardation. No EPS/TD.  Normal gait and station. Normal muscle tone and bulk.   Speech: Regular rate, rhythm, volume and tone, spontaneous,  fluent. Non-pressured.   Mood: \"up and down\"   Affect: Neutral.   Thought Process: Organized, linear, goal directed. "   Thought Content: Does not endorse suicidal ideation or any suicide plans.   Does not endorse homicidal ideation.  No overt delusions or paranoia elicited.    Thought Perception: Does not endorse auditory or  visual hallucinations, does not appear to be responding to hallucinatory stimuli.   Cognition: Alert, oriented x 4. Deficits noted, mild confusion.  Adequate fund of knowledge. No deficit in recent and remote memory. No deficits in attention, concentration or language.   Insight: Fair, as patient recognizes symptoms of  illness and need for recommended treatments.    Judgment: Intact, as patient can make reasonable decisions about ordinary activities of daily living and necessary medical care recommendations.       Last Recorded Vitals  Visit Vitals  /86 (BP Location: Left arm, Patient Position: Lying)   Pulse 109   Temp 37.1 °C (98.8 °F) (Temporal)   Resp 20        Relevant Results  Results for orders placed or performed during the hospital encounter of 11/04/24 (from the past 24 hours)   Sars-CoV-2 PCR   Result Value Ref Range    Coronavirus 2019, PCR Not Detected Not Detected   Influenza A, and B PCR   Result Value Ref Range    Flu A Result Not Detected Not Detected    Flu B Result Not Detected Not Detected   ECG 12 lead   Result Value Ref Range    Ventricular Rate 117 BPM    Atrial Rate 117 BPM    NC Interval 122 ms    QRS Duration 94 ms    QT Interval 350 ms    QTC Calculation(Bazett) 488 ms    P Axis 69 degrees    R Axis -35 degrees    T Axis 51 degrees    QRS Count 20 beats    Q Onset 211 ms    P Onset 150 ms    P Offset 199 ms    T Offset 386 ms    QTC Fredericia 437 ms   CBC and Auto Differential   Result Value Ref Range    WBC 18.9 (H) 4.4 - 11.3 x10*3/uL    nRBC 0.0 0.0 - 0.0 /100 WBCs    RBC 5.08 4.50 - 5.90 x10*6/uL    Hemoglobin 15.3 13.5 - 17.5 g/dL    Hematocrit 44.5 41.0 - 52.0 %    MCV 88 80 - 100 fL    MCH 30.1 26.0 - 34.0 pg    MCHC 34.4 32.0 - 36.0 g/dL    RDW 13.3 11.5 - 14.5 %     Platelets 401 150 - 450 x10*3/uL    Neutrophils % 84.8 40.0 - 80.0 %    Immature Granulocytes %, Automated 0.4 0.0 - 0.9 %    Lymphocytes % 6.7 13.0 - 44.0 %    Monocytes % 7.8 2.0 - 10.0 %    Eosinophils % 0.1 0.0 - 6.0 %    Basophils % 0.2 0.0 - 2.0 %    Neutrophils Absolute 16.01 (H) 1.20 - 7.70 x10*3/uL    Immature Granulocytes Absolute, Automated 0.08 0.00 - 0.70 x10*3/uL    Lymphocytes Absolute 1.27 1.20 - 4.80 x10*3/uL    Monocytes Absolute 1.47 (H) 0.10 - 1.00 x10*3/uL    Eosinophils Absolute 0.01 0.00 - 0.70 x10*3/uL    Basophils Absolute 0.04 0.00 - 0.10 x10*3/uL   Magnesium   Result Value Ref Range    Magnesium 2.31 1.60 - 2.40 mg/dL   Comprehensive metabolic panel   Result Value Ref Range    Glucose 111 (H) 74 - 99 mg/dL    Sodium 138 136 - 145 mmol/L    Potassium 3.2 (L) 3.5 - 5.3 mmol/L    Chloride 100 98 - 107 mmol/L    Bicarbonate 25 21 - 32 mmol/L    Anion Gap 16 10 - 20 mmol/L    Urea Nitrogen 12 6 - 23 mg/dL    Creatinine 1.44 (H) 0.50 - 1.30 mg/dL    eGFR 58 (L) >60 mL/min/1.73m*2    Calcium 9.8 8.6 - 10.3 mg/dL    Albumin 4.5 3.4 - 5.0 g/dL    Alkaline Phosphatase 76 33 - 120 U/L    Total Protein 7.6 6.4 - 8.2 g/dL    AST 14 9 - 39 U/L    Bilirubin, Total 1.4 (H) 0.0 - 1.2 mg/dL    ALT 11 10 - 52 U/L   Ammonia   Result Value Ref Range    Ammonia 31 16 - 53 umol/L   Troponin I, High Sensitivity   Result Value Ref Range    Troponin I, High Sensitivity 20 0 - 20 ng/L   Blood Gas Venous Full Panel   Result Value Ref Range    POCT pH, Venous 7.44 (H) 7.33 - 7.43 pH    POCT pCO2, Venous 38 (L) 41 - 51 mm Hg    POCT pO2, Venous 42 35 - 45 mm Hg    POCT SO2, Venous 70 45 - 75 %    POCT Oxy Hemoglobin, Venous 68.6 45.0 - 75.0 %    POCT Hematocrit Calculated, Venous 49.0 41.0 - 52.0 %    POCT Sodium, Venous 137 136 - 145 mmol/L    POCT Potassium, Venous 3.4 (L) 3.5 - 5.3 mmol/L    POCT Chloride, Venous 101 98 - 107 mmol/L    POCT Ionized Calicum, Venous 1.23 1.10 - 1.33 mmol/L    POCT Glucose, Venous 115  (H) 74 - 99 mg/dL    POCT Lactate, Venous 6.6 (HH) 0.4 - 2.0 mmol/L    POCT Base Excess, Venous 1.7 -2.0 - 3.0 mmol/L    POCT HCO3 Calculated, Venous 25.8 22.0 - 26.0 mmol/L    POCT Hemoglobin, Venous 16.2 13.5 - 17.5 g/dL    POCT Anion Gap, Venous 14.0 10.0 - 25.0 mmol/L    Patient Temperature      FiO2 21 %   Acute Toxicology Panel, Blood   Result Value Ref Range    Acetaminophen <10.0 10.0 - 30.0 ug/mL    Salicylate  <3 4 - 20 mg/dL    Alcohol <10 <=10 mg/dL   Light Blue Top   Result Value Ref Range    Extra Tube Hold for add-ons.    SST TOP   Result Value Ref Range    Extra Tube Hold for add-ons.    Gray Top   Result Value Ref Range    Extra Tube Hold for add-ons.    Creatine Kinase   Result Value Ref Range    Creatine Kinase 393 (H) 0 - 325 U/L   Lactate   Result Value Ref Range    Lactate 5.3 (HH) 0.4 - 2.0 mmol/L   Blood Culture    Specimen: Peripheral Venipuncture; Blood culture   Result Value Ref Range    Blood Culture Loaded on Instrument - Culture in progress    Blood Culture    Specimen: Peripheral Venipuncture; Blood culture   Result Value Ref Range    Blood Culture Loaded on Instrument - Culture in progress    Drug Screen, Urine   Result Value Ref Range    Amphetamine Screen, Urine Presumptive Negative Presumptive Negative    Barbiturate Screen, Urine Presumptive Negative Presumptive Negative    Benzodiazepines Screen, Urine Presumptive Negative Presumptive Negative    Cannabinoid Screen, Urine Presumptive Negative Presumptive Negative    Cocaine Metabolite Screen, Urine Presumptive Negative Presumptive Negative    Fentanyl Screen, Urine Presumptive Negative Presumptive Negative    Opiate Screen, Urine Presumptive Negative Presumptive Negative    Oxycodone Screen, Urine Presumptive Negative Presumptive Negative    PCP Screen, Urine Presumptive Negative Presumptive Negative    Methadone Screen, Urine Presumptive Negative Presumptive Negative   Urinalysis with Reflex Culture and Microscopic   Result  Value Ref Range    Color, Urine Yellow Straw, Yellow    Appearance, Urine Hazy (N) Clear    Specific Gravity, Urine 1.053 (N) 1.005 - 1.035    pH, Urine 5.0 5.0, 5.5, 6.0, 6.5, 7.0, 7.5, 8.0    Protein, Urine 30 (1+) (N) NEGATIVE mg/dL    Glucose, Urine NEGATIVE NEGATIVE mg/dL    Blood, Urine NEGATIVE NEGATIVE    Ketones, Urine 20 (1+) (A) NEGATIVE mg/dL    Bilirubin, Urine NEGATIVE NEGATIVE    Urobilinogen, Urine <2.0 <2.0 mg/dL    Nitrite, Urine NEGATIVE NEGATIVE    Leukocyte Esterase, Urine NEGATIVE NEGATIVE   Extra Urine Gray Tube   Result Value Ref Range    Extra Tube Hold for add-ons.    Urinalysis Microscopic   Result Value Ref Range    WBC, Urine 1-5 1-5, NONE /HPF    RBC, Urine 1-2 NONE, 1-2, 3-5 /HPF    Bacteria, Urine 1+ (A) NONE SEEN /HPF    Mucus, Urine 4+ Reference range not established. /LPF    Hyaline Casts, Urine 3+ (A) NONE /LPF   Lactate   Result Value Ref Range    Lactate 1.7 0.4 - 2.0 mmol/L   CBC   Result Value Ref Range    WBC 12.0 (H) 4.4 - 11.3 x10*3/uL    nRBC 0.0 0.0 - 0.0 /100 WBCs    RBC 4.65 4.50 - 5.90 x10*6/uL    Hemoglobin 13.9 13.5 - 17.5 g/dL    Hematocrit 40.6 (L) 41.0 - 52.0 %    MCV 87 80 - 100 fL    MCH 29.9 26.0 - 34.0 pg    MCHC 34.2 32.0 - 36.0 g/dL    RDW 13.6 11.5 - 14.5 %    Platelets 359 150 - 450 x10*3/uL   Basic metabolic panel   Result Value Ref Range    Glucose 120 (H) 74 - 99 mg/dL    Sodium 134 (L) 136 - 145 mmol/L    Potassium 3.2 (L) 3.5 - 5.3 mmol/L    Chloride 103 98 - 107 mmol/L    Bicarbonate 25 21 - 32 mmol/L    Anion Gap 9 (L) 10 - 20 mmol/L    Urea Nitrogen 10 6 - 23 mg/dL    Creatinine 1.02 0.50 - 1.30 mg/dL    eGFR 87 >60 mL/min/1.73m*2    Calcium 8.7 8.6 - 10.3 mg/dL   Creatine Kinase   Result Value Ref Range    Creatine Kinase 804 (H) 0 - 325 U/L             The patient is judged a minimal suicide risk due to: 1) No access to guns, 2) Denies any recent suicide attempts, 3) Denies any current suicidal ideation or suicide plans, 4) +plans for the future:  "\"Go to a shelter\" and 5) No current symptoms of Major Depressive Disorder.        Diagnostic Impression:  1) Schizophrenia  2) Acute delirium due to another medical condition  3) Sepsis  4) Hypokalemia  5) LIZANDRO        Recommendations:  1) Patient does not meet criteria for involuntary psychiatric admission at this time.  No psychosis noted.     2) Continue to treat underlying medical issues. WBC on admission 18.9, today 12.  CK trending up from 393 to 804.  Anticipate confusion to improve as medical conditions are treated.    3) Increase quetiapine 100 mg--150 mg PO at bedtime.  EKG- Qtc- 488  4) Discharge disposition, defer to [facility].  Patient is currently homeless.  Anticipate discharge to shelter.    5) Will continue to follow while here.  Once patient is discharged he needs to follow up with Abelardo Obrien CNP at [facility] for psychiatry.  Patient is established there.      I spent 120 minutes in the professional and overall care of this patient.        ELISE Sanford, HILL, PMHNP    "

## 2024-11-05 NOTE — NURSING NOTE
Patient was content in room this shift. Until 1400 patient started barricading himself in his room by piling up everything against the door and not letting staff in. As approaching room and using calm conversation this nurse along with staff were able to safely get access into patient room.  PD called to floor to aid assistance.. Patient then backed himself into bathroom with door open. Patient threw his phone and broke it. Patient stated he wanted his nephew to come and get him and that we talked to him which was false. Patient had not been in contact with nephew this shift. Patient began stating that he was standing his ground and will fight till the death. After attempting to negotiate, patient still refused. Provider aware and discharged patient. Patient continued to refuse to leave. Stated he was here to get psych treatment. [place] and [place] PD came to assist as well. Staff and PD were able successfully remove patient out of the building.

## 2024-11-09 LAB
BACTERIA BLD CULT: NORMAL
BACTERIA BLD CULT: NORMAL

## 2024-11-10 NOTE — DISCHARGE SUMMARY
Discharge Diagnosis  Sepsis (Multi)    Issues Requiring Follow-Up  None    Test Results Pending At Discharge  Pending Labs       No current pending labs.            Hospital Course  Jey Morgan is a 54 y.o. male presenting with a complaint of altered mental status. He was brought to the ED by the [place] Police [employer]. For altered mental status. He was found walking and wandering [place] [address] this morning. He was not cooperating with the police officers and became combative. He was confused and for his safety they took him into custody after tasing him in the back. He was at [facility] yesterday; he reports he went there because he felt crazy. He still had his arm band on. He has a hx of schizophrenia. He is a poor historian. He told the ED he is homeless; but has friends in [place] and [place], [place].     In the ED:  Labs: Glu 111; Na 138; K 3.2; BUN 12 Cr 1.44; ; Lactate 5.3; WBC 18.9; Hb 15.3; Hcrt 44.5;   Radiology: CT head no mass or hemorrhage. CT chest/Abd/pelvis Enlarged prostate. Otherwise unremarkable CT scan of the chest, abdomen and pelvis. No acute process.  Medications: Zosyn, vancomycin, zofran  Disposition: admitted to med/surg telemetry    Sepsis of unknown origin  Metabolic Encephalopathy d/t infection vs psych  Hx Schizophrenia  - WBC 18.9  - COVID negative/Flu negative  - Lactate 5.3 on admission; now 1.7  - given  2 liters of 0.9% NS in the ED  - given zosyn 4.5 g and Vancomycin 2g in the ED  - started Augmentin 875 mg BID  - continue quetiapine 100 mg hs  - started lorazepam 0.5 mg q8h prn  - Psych consult  - CT chest/Abd/pelvis: Enlarged prostate. Otherwise unremarkable CT scan of the chest, abdomen and pelvis. No acute process.  - CT head: No evidence of acute cortical infarct or intracranial hemorrhage.   Hypokalemia  - K 3.2  - given 40 mEq of KCL in the ED  - monitor BMP     Acute Kidney injury  - creatine 1.44 on admission  - baseline creatine unknown  - CK  393  - given 2 liters of IVF in the ED  - monitor renal function     > 30min in discharge coordination of care which includes: discharge planning, medication reconciliation, arranging appropriate follow up and discussion of discharge instructions with the patient.      Pertinent Physical Exam At Time of Discharge  Physical Exam  Vitals and nursing note reviewed.   HENT:      Head: Normocephalic and atraumatic.      Mouth/Throat:      Mouth: Mucous membranes are moist.   Eyes:      Extraocular Movements: Extraocular movements intact.      Conjunctiva/sclera: Conjunctivae normal.   Cardiovascular:      Rate and Rhythm: Normal rate and regular rhythm.      Pulses: Normal pulses.      Heart sounds: Normal heart sounds.   Pulmonary:      Effort: Pulmonary effort is normal.      Breath sounds: Normal breath sounds.   Abdominal:      General: Abdomen is flat. Bowel sounds are normal.   Skin:     General: Skin is warm.   Neurological:      Mental Status: He is alert. Mental status is at baseline.   Psychiatric:         Mood and Affect: Affect is angry.         Speech: Speech is rapid and pressured.         Behavior: Behavior is agitated and aggressive.         Home Medications     Medication List      You have not been prescribed any medications.       Outpatient Follow-Up  No future appointments.    Quincy Hernández DO

## 2024-11-14 LAB
ATRIAL RATE: 117 BPM
P AXIS: 69 DEGREES
P OFFSET: 199 MS
P ONSET: 150 MS
PR INTERVAL: 122 MS
Q ONSET: 211 MS
QRS COUNT: 20 BEATS
QRS DURATION: 94 MS
QT INTERVAL: 350 MS
QTC CALCULATION(BAZETT): 488 MS
QTC FREDERICIA: 437 MS
R AXIS: -35 DEGREES
T AXIS: 51 DEGREES
T OFFSET: 386 MS
VENTRICULAR RATE: 117 BPM